# Patient Record
(demographics unavailable — no encounter records)

---

## 2024-12-12 NOTE — REASON FOR VISIT
[Follow-Up: _____] : a [unfilled] follow-up visit
[Follow-Up: _____] : a [unfilled] follow-up visit
13-Nov-2024 12:48

## 2024-12-17 NOTE — DATA REVIEWED
[FreeTextEntry1] : C: [id number]     EXAM:  MG MAMMO SCREEN W GAIL BI#     12/07/2024 ITERPRETATION:  HISTORY: Bilateral MG MAMMO SCREEN W GAIL BI# was performed. Patient is 69 years old and is seen for screening.  RISK ASSESSMENT: NCI Lifetime Risk: 5.9 Tyrer-zi Lifetime Risk: 3.8  CLINICAL BREAST EXAM: The patient reports their last clinical breast exam was performed within the past year.  COMPARISON STUDIES: The present examination has been compared to prior imaging studies performed at [facility] on 11/26/2022 and 12/02/2023.  MAMMOGRAM FINDINGS: Mammography was performed including the following views: bilateral craniocaudal with tomosynthesis, bilateral mediolateral oblique with tomosynthesis.  The examination includes digital synthetic 2D and digital tomosynthesis 3D images. Additional imaging analysis was performed using CAD (computer-aided detection) software.  The breasts are heterogeneously dense, which may obscure small masses.  No suspicious mass, grouping of calcifications, or other abnormality is identified.  IMPRESSION: No mammographic evidence of malignancy.  RECOMMENDATION: Unless otherwise indicated by clinical findings, annual screening mammography recommended.  ASSESSMENT: BI-RADS Category 1:  Negative  [id number]     EXAM:  MG US BREAST COMPLETE BI   ORDERED BY: DIANE VELEZ  PROCEDURE DATE:  12/07/2024    INTERPRETATION:  Clinical history: Screening bilateral breast ultrasound.  The patient reports last clinical breast examination was performed about: Within the past year.  Family history of breast cancer: Uncertain.  Comparisons: No prior breast ultrasound.  Breast composition: The breasts are heterogeneously dense, which may obscure small masses.  Findings:  Ultrasound:  Bilateral whole breast ultrasound was performed.  No suspicious solid or cystic mass noted in either breast. No right or left axillary lymphadenopathy.  Impression: No sonographic evidence of malignancy.  Recommendation: Unless otherwise indicated by clinical findings, annual screening mammography recommended.  BI-RADS Category 1: Negative

## 2024-12-17 NOTE — PHYSICAL EXAM
[Normocephalic] : normocephalic [Atraumatic] : atraumatic [EOMI] : extra ocular movement intact [Examined in the supine and seated position] : examined in the supine and seated position [Symmetrical] : symmetrical [No dominant masses] : no dominant masses in right breast  [No dominant masses] : no dominant masses left breast [No Nipple Retraction] : no left nipple retraction [No Nipple Discharge] : no left nipple discharge [No Axillary Lymphadenopathy] : no left axillary lymphadenopathy [No Edema] : no edema [No Rashes] : no rashes [No Ulceration] : no ulceration [de-identified] : On physical exam, there are no discrete masses in either breast or axilla. There is no nipple discharge or inversion bilaterally. There are no skin changes bilaterally.

## 2024-12-17 NOTE — PAST MEDICAL HISTORY
[Postmenopausal] : The patient is postmenopausal [Menarche Age ____] : age at menarche was [unfilled] [Menopause Age____] : age at menopause was [unfilled] [FreeTextEntry5] : CALIXTO/BSO  [FreeTextEntry2] : none [FreeTextEntry6] : none [FreeTextEntry7] : none [FreeTextEntry8] : none

## 2024-12-17 NOTE — ASSESSMENT
[FreeTextEntry1] : Patient is a 69F with fibrocystic changes.  Her PE today is unremarkable.  12/07/2024 b/l mammo and US -->birads2 breasts are heterogeneously dense  No sonographic evidence of malignancy  We discussed breast density. Increasing breast density has been found to increase ones risk of breast cancer, but at this time, there is no clear indication for additional imaging in this setting, as both US and MRI have not been found to improve survival. One can consider bilateral screening US. However, out of 1000 women screened, the use of routine US will only identify an additional 3-5 cancers. The use of US was found to increase the likelihood of undergoing more imaging and more biopsies. She does have dense breasts. We have decided to proceed with screening bilateral breast US at this time. This will be scheduled with her next screening mammogram.   All questions and concerns were answered in detail.   Patient is for bilateral screening mmg/us in 12/8/25 Patient is to follow up after imaging, pending any interval changes.  Total time spent on encounter was greater than 20 minutes, which included face to face time with the patient, performing an exam, reviewing previous medical records including imaging/ pathology, documenting in patient record and coordinating care/imaging. Greater than 50% of the encounter was spent on counseling and coordination of her breast issue.

## 2024-12-17 NOTE — DATA REVIEWED
[FreeTextEntry1] : C: [id number]     EXAM:  MG MAMMO SCREEN W GAIL BI#     12/07/2024 ITERPRETATION:  HISTORY: Bilateral MG MAMMO SCREEN W GAIL BI# was performed. Patient is 69 years old and is seen for screening.  RISK ASSESSMENT: NCI Lifetime Risk: 5.9 Tyrer-zi Lifetime Risk: 3.8  CLINICAL BREAST EXAM: The patient reports their last clinical breast exam was performed within the past year.  COMPARISON STUDIES: The present examination has been compared to prior imaging studies performed at [facility] on 11/26/2022 and 12/02/2023.  MAMMOGRAM FINDINGS: Mammography was performed including the following views: bilateral craniocaudal with tomosynthesis, bilateral mediolateral oblique with tomosynthesis.  The examination includes digital synthetic 2D and digital tomosynthesis 3D images. Additional imaging analysis was performed using CAD (computer-aided detection) software.  The breasts are heterogeneously dense, which may obscure small masses.  No suspicious mass, grouping of calcifications, or other abnormality is identified.  IMPRESSION: No mammographic evidence of malignancy.  RECOMMENDATION: Unless otherwise indicated by clinical findings, annual screening mammography recommended.  ASSESSMENT: BI-RADS Category 1:  Negative  [id number]     EXAM:  MG US BREAST COMPLETE BI   ORDERED BY: DIANE VELEZ  PROCEDURE DATE:  12/07/2024    INTERPRETATION:  Clinical history: Screening bilateral breast ultrasound.  The patient reports last clinical breast examination was performed about: Within the past year.  Family history of breast cancer: Uncertain.  Comparisons: No prior breast ultrasound.  Breast composition: The breasts are heterogeneously dense, which may obscure small masses.  Findings:  Ultrasound:  Bilateral whole breast ultrasound was performed.  No suspicious solid or cystic mass noted in either breast. No right or left axillary lymphadenopathy.  Impression: No sonographic evidence of malignancy.  Recommendation: Unless otherwise indicated by clinical findings, annual screening mammography recommended.  BI-RADS Category 1: Negative

## 2024-12-17 NOTE — HISTORY OF PRESENT ILLNESS
[FreeTextEntry1] : Patient is a 67F here for annual clinical exam. Former patient of Dr. Jostin Ramos.  No prior complaints related to the breasts. No family history of breast or ovarian cancer.  Her Heather Model risk assessment is: 1.6 % in five years  7.9 % in her lifetime.   Her most recent imaging is as follows: 11/20/2021: B scg mmg --> BIRADS 1 Dense No evidence of malignancy   11/26/22: B scg mmg --> BIRADS 1 Dense No evidence of malignancy  12/14/23 b/l mammo and US deemed BIRADS1  She has no breast related complaints at this time.  She denies any breast pain, has not palpated any new palpable masses in either breast and denies any nipple discharge or retraction.  12/07/2024 b/l mammo and US -->birads2 breasts are heterogeneously dense  No sonographic evidence of malignancy  She has no breast related complaints at this time.  She denies any breast pain, has not palpated any new palpable masses in either breast and denies any nipple discharge or retraction.

## 2024-12-17 NOTE — PHYSICAL EXAM
[Normocephalic] : normocephalic [Atraumatic] : atraumatic [EOMI] : extra ocular movement intact [Examined in the supine and seated position] : examined in the supine and seated position [Symmetrical] : symmetrical [No dominant masses] : no dominant masses in right breast  [No dominant masses] : no dominant masses left breast [No Nipple Retraction] : no left nipple retraction [No Nipple Discharge] : no left nipple discharge [No Axillary Lymphadenopathy] : no left axillary lymphadenopathy [No Edema] : no edema [No Rashes] : no rashes [No Ulceration] : no ulceration [de-identified] : On physical exam, there are no discrete masses in either breast or axilla. There is no nipple discharge or inversion bilaterally. There are no skin changes bilaterally.

## 2025-03-04 NOTE — HISTORY OF PRESENT ILLNESS
[FreeTextEntry1] : Ms. Holbrook is a 69 year-old female with PVD s/p kissing iliac stents in May 2017 for lifestyle limiting claudication and right carotid endarterectomy on 1/11/18.

## 2025-03-04 NOTE — DATA REVIEWED
[FreeTextEntry1] : I performed a carotid duplex which was medically necessary to evaluate for patency of the endarterectomy site. It showed widely patent right CEA site and 50-69% Left ICA stenosis. I performed an arterial duplex which was medically necessary to evaluate for patency of the iliac artery stents. It showed patent left iliac artery stent and flow disturbance in the right iliac artery with velocity of > 400 cm/s, unchanged.

## 2025-03-04 NOTE — ASSESSMENT
[FreeTextEntry1] : Ms. Holbrook is a 69 year-old female with PVD s/p kissing iliac stents in May 2017 for lifestyle limiting claudication and right carotid endarterectomy on 1/11/18.   Carotid duplex showed widely patent right CEA site and 50-69% Left ICA stenosis. Arterial duplex today showed patent left iliac artery stent and flow disturbance in the right iliac artery with velocity >400 cm/s, unchanged.  She is asymptomatic from her iliac artery stenosis at this time. I would like to see the patient back in my six months' time or sooner if any new symptoms develop.   I, Dr. Reagan Vines, personally performed the evaluation and management (E/M) services for this established patient who presents today with (a) new problem(s)/exacerbation of (an) existing condition(s). That E/M includes conducting the clinically appropriate interval history &/or exam, assessing all new/exacerbated conditions, and establishing a new plan of care. Today, my BLOSSOM, Korina Murray PA-C, was here to observe my evaluation and management service for this new problem/exacerbated condition and follow the plan of care established by me going forward.

## 2025-06-17 NOTE — END OF VISIT
[FreeTextEntry3] :  I, Dr. Bear, personally performed the evaluation and management (E/M) services for this established patient who presents today with (a) new problem(s)/exacerbation of (an) existing condition(s). That E/M includes conducting the clinically appropriate interval history &/or exam, assessing all new/exacerbated conditions, and establishing a new plan of care. Today, my ACP, was here to observe my evaluation and management service for this new problem/exacerbated condition and follow the plan of care established by me going forward.  [Time Spent: ___ minutes] : I have spent [unfilled] minutes of time on the encounter which excludes teaching and separately reported services.

## 2025-06-17 NOTE — HISTORY OF PRESENT ILLNESS
[FreeTextEntry1] : 69 year old female at average risk for CRC, Hypothyroidism, is here today for yearly colonoscopy recall due to fairly prepped colon 6/24.   She denied abd pain, vomiting, dysphagia, heartburn, weight loss, constipation, diarrhea, or blood in the stool.   US of Abdomen done 4/23/24: Hepatic Steatosis, no gallstones or cholecystitis, B/L nephrolithiasis, No hydronephrosis  EGD 6/24: normal esophagus, non-erosive gastritis, and normal duodenum. Biopsies showed gastric IM without Dysplasia, HP negative  Colonoscopy done 6/24:  6 mm SC polyp, TA without HGD, external hemorrhoids but with poor prep in the right colon.   Prior EGD 2021 and last colonoscopy in 2021; incomplete colonoscopy due to adhesions and stricturing; she declined a CT colonography and is here for F/U and new colonoscopy trial. Also with H/O elevated liver enzymes. She denied pruritus or jaundice.

## 2025-06-17 NOTE — ASSESSMENT
[FreeTextEntry1] : 69 year old female at average risk for CRC, Hypothyroidism, is here today for yearly colonoscopy recall due to fairly prepped colon 6/24.     1- Incomplete colonoscopy r/o sigmoid stricture/CRC Screening in 2021; pt did not tolerate CT colono.  Repeat colonoscopy in 2024 was fair prep and TAs were removed , no strictures noted but redundent sigmoid will repeat colonoscopy with aggressive prep; risks vs benefits discussed    2.Right rib cage pain likely musculoskeletal, resolved - US of Abdomen showed no gallstones She was told to take Tylenol arthritis prn/pain  3 elevated Alk Phos, nl bili Hepatic Steatosis on US, S1 and F0-1 on fibroscan Repeat CBC, CMP  4. Chronic GERD Antral IM on EGD in 2024 Will do an EGD with antral mapping on the same day as the colonoscopy; risks and benefits discussed  5. Chronic Diarrhea, resolved  She was told to take pepto bismol prn.   I have spent 40 minutes of time on the encounter which excludes teaching time and/or separately reported services.